# Patient Record
Sex: MALE | Race: WHITE | Employment: UNEMPLOYED | URBAN - METROPOLITAN AREA
[De-identification: names, ages, dates, MRNs, and addresses within clinical notes are randomized per-mention and may not be internally consistent; named-entity substitution may affect disease eponyms.]

---
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
## 2022-01-05 ENCOUNTER — OFFICE VISIT (OUTPATIENT)
Dept: ORTHOPEDIC SURGERY | Age: 22
End: 2022-01-05
Payer: OTHER GOVERNMENT

## 2022-01-05 VITALS — RESPIRATION RATE: 16 BRPM | WEIGHT: 120 LBS | BODY MASS INDEX: 16.8 KG/M2 | HEIGHT: 71 IN

## 2022-01-05 DIAGNOSIS — S62.024A NONDISPLACED FRACTURE OF MIDDLE THIRD OF NAVICULAR (SCAPHOID) BONE OF RIGHT WRIST, INITIAL ENCOUNTER FOR CLOSED FRACTURE: Primary | ICD-10-CM

## 2022-01-05 PROCEDURE — 99024 POSTOP FOLLOW-UP VISIT: CPT | Performed by: PHYSICIAN ASSISTANT

## 2022-01-05 PROCEDURE — 29075 APPL CST ELBW FNGR SHORT ARM: CPT | Performed by: PHYSICIAN ASSISTANT

## 2022-01-05 NOTE — PROGRESS NOTES
Shirley Goodman PA-C. ordered a spica cast  to be applied to Felicita Guerra's left upper Hand . I applied a spica cast cast to Felicita Guerra's left upper Hand. I applied 2 rolls of under padding in a 50/50 fashion. The Felicita Guerra requested orange color fiberglass. I rolled 2 rolls of fiberglass in a 50/50 fashion. His left upper Hand is in a neutral degree position Fracisco Guillaume PA-C . Felicita Guerra's nail beds are pink in color, the extremity is warm to the touch. Capillary refill is less than 2 seconds. Rhiannon Shin instructed on proper care of cast.  Do not get wet, keep all items out of cast.  If cast is painful please make appointment to get checked. Patient also briefed on circulation compromise. If digits are cold, blue, and tingling patient must must seek care. If after hours patient is to go to Emergency Room. During office hours patient must come in to office.

## 2022-01-05 NOTE — PROGRESS NOTES
Mr. Jairon Liriano returns today in follow-up of his recent left Scaphoid Nonunion with distal radius bone graft Repair done approximately 1 week ago. He has done well noting mild discomfort and no other reported complications. He notes mild symptoms of numbness, tingling, that has improved in the last no symptoms related to perfusion. Physical Exam:  Skin incision is healing well, without erythema, drainage or sign of infection. Digital range of motion is Full. FPL function is Intact, EPL function is Intact  Wrist range of motion is stiff from immobilization and not stressed today. Sensation is tingling} in the dorsal aspect of the wrist near his surgical incision. Vascular examination reveals normal, good capillary refill and good color. Swelling is minimal.  There is some tenderness to palpation of the scaphoid at the anatomic snuff-box or the volar tuberacle. Radiographic Evaluation:  Radiographs were obtained today (3 views of the left wrist & scaphoid view). They demonstrate good restoration of alignment of the scaphoid fragments without sign of hardware loosening or failure of fixation. Impression:  Mr. Jairon Liriano is doing well after recent left Scaphoid Nonunion with distal radius bone graft Repair. Plan:  Mr. Jairon Liriano is returned to a short arm thumb spica fiberglass cast and given instructions regarding the appropriate wear and care of the device. He is also instructed in work on Active & Passive range of motion of the digits & elbow. These modalities were specifically demonstrated to him today. We discussed the necessary restrictions on the use of the injured hand & wrist and the limitations on resumption of activities. We discussed the appropriate expectations and timeline for symptom improvement. He is provided a written patient instruction sheet titled: Postoperative Instructions After Scaphoid Fracture Repair.     I have asked Mr. Jairon Liriano to follow-up with me by scheduling an appointment for 1 month from now at which time we will obtain repeat radiographs of the wrist & Scaphoid out of the cast.     He is also specifically instructed to return to the office or call for an appointment sooner if his symptoms are changing or worsening prior to that time.

## 2022-01-24 ENCOUNTER — OFFICE VISIT (OUTPATIENT)
Dept: ORTHOPEDIC SURGERY | Age: 22
End: 2022-01-24
Payer: OTHER GOVERNMENT

## 2022-01-24 VITALS — BODY MASS INDEX: 16.8 KG/M2 | WEIGHT: 120 LBS | HEIGHT: 71 IN | RESPIRATION RATE: 15 BRPM

## 2022-01-24 DIAGNOSIS — S62.024A NONDISPLACED FRACTURE OF MIDDLE THIRD OF NAVICULAR (SCAPHOID) BONE OF RIGHT WRIST, INITIAL ENCOUNTER FOR CLOSED FRACTURE: Primary | ICD-10-CM

## 2022-01-24 PROCEDURE — 99024 POSTOP FOLLOW-UP VISIT: CPT | Performed by: PHYSICIAN ASSISTANT

## 2022-01-24 PROCEDURE — 29075 APPL CST ELBW FNGR SHORT ARM: CPT | Performed by: PHYSICIAN ASSISTANT

## 2022-01-24 NOTE — PROGRESS NOTES
Dana Batista PA-C. ordered a spica cast  to be applied to Felicita Guerra's left upper Hand . I applied a spica cast cast to Felicita Guerra's left upper Hand. I applied 2 rolls of under padding in a 50/50 fashion. The Felicita Guerra requested orange color fiberglass. I rolled 2 rolls of fiberglass in a 50/50 fashion. His right upper and left upper Hand is in a neutral  position Fracisco Guillaume PA-C . Felicita Guerra's nail beds are pink in color, the extremity is warm to the touch. Capillary refill is less than 2 seconds. Liane Andersen instructed on proper care of cast.  Do not get wet, keep all items out of cast.  If cast is painful please make appointment to get checked. Patient also briefed on circulation compromise. If digits are cold, blue, and tingling patient must must seek care. If after hours patient is to go to Emergency Room. During office hours patient must come in to office.         [address] [facility], OTR/L [id number]

## 2022-01-24 NOTE — PROGRESS NOTES
Mr. Debra Rider returns today in follow-up of his recent left Scaphoid Fracture Nonunion with percutaneous distal radius bone graft  Repair done approximately 4 weeks ago. He has done well noting mild discomfort and no other reported complications. He notes no symptoms of numbness, tingling, no symptoms related to perfusion. Physical Exam:  Skin incision is healing well, without erythema, drainage or sign of infection. Digital range of motion is Full. FPL function is Intact, EPL function is Intact  Wrist range of motion is stiff from immobilization and not stressed today. Sensation is normal} in the Whole Hand. Vascular examination reveals normal, good capillary refill and good color. Swelling is minimal.  There is little tenderness to palpation of the scaphoid at the anatomic snuff-box or the volar tuberacle. Radiographic Evaluation:  Radiographs were obtained today (3 views of the left wrist & scaphoid view). They demonstrate good restoration of alignment of the scaphoid fragments without sign of hardware loosening or failure of fixation. Impression:  Mr. Debra Rider is doing well after recent left Scaphoid Fracture Nonunion with percutaneous distal radius bone graft  Repair     Plan:  Mr. Debra Rider is returned to a short arm thumb spica fiberglass cast and given instructions regarding the appropriate wear and care of the device. He is also instructed in work on Active & Passive range of motion of the digits & elbow. These modalities were specifically demonstrated to him today. We discussed the necessary restrictions on the use of the injured hand & wrist and the limitations on resumption of activities. We discussed the appropriate expectations and timeline for symptom improvement. He is provided a written patient instruction sheet titled: Postoperative Instructions After Scaphoid Fracture Repair.     I have asked Mr. Debra Rider to follow-up with me by scheduling an appointment for 1 month from now at which time we will obtain repeat radiographs of the wrist & Scaphoid out of the cast.     He is also specifically instructed to return to the office or call for an appointment sooner if his symptoms are changing or worsening prior to that time.

## 2022-02-21 ENCOUNTER — OFFICE VISIT (OUTPATIENT)
Dept: ORTHOPEDIC SURGERY | Age: 22
End: 2022-02-21
Payer: OTHER GOVERNMENT

## 2022-02-21 VITALS — WEIGHT: 120 LBS | HEIGHT: 71 IN | BODY MASS INDEX: 16.8 KG/M2 | RESPIRATION RATE: 16 BRPM

## 2022-02-21 DIAGNOSIS — S62.024A NONDISPLACED FRACTURE OF MIDDLE THIRD OF NAVICULAR (SCAPHOID) BONE OF RIGHT WRIST, INITIAL ENCOUNTER FOR CLOSED FRACTURE: Primary | ICD-10-CM

## 2022-02-21 PROCEDURE — 99024 POSTOP FOLLOW-UP VISIT: CPT | Performed by: ORTHOPAEDIC SURGERY

## 2022-02-21 PROCEDURE — 29075 APPL CST ELBW FNGR SHORT ARM: CPT | Performed by: ORTHOPAEDIC SURGERY

## 2022-02-21 NOTE — PROGRESS NOTES
Mr. Chris Heredia returns today in follow-up of his recent right Scaphoid Fracture Repair done approximately 6 weeks ago. He has done well noting mild discomfort and no other reported complications. He notes no symptoms of numbness, tingling, no symptoms related to perfusion. Physical Exam:  Skin incision is fully healed , nontender. Digital range of motion is without significant limitation. FPL function is Intact, EPL function is Intact  Wrist range of motion is stiff from immobilization and not stressed today. Sensation is normal} in the Whole Hand. Vascular examination reveals normal and good capillary refill. Swelling is mild. There is little tenderness to palpation of the scaphoid at the anatomic snuff-box or the volar tuberacle. Radiographic Evaluation:  Radiographs were obtained today (3 views of the right wrist & scaphoid view). They demonstrate good restoration of alignment of the scaphoid fragments without sign of hardware loosening or failure of fixation. Impression:  Mr. Chris Heredia is doing well after recent right Scaphoid Fracture Repair. Plan:  Mr. Chris Heredia is returned to a short arm thumb spica fiberglass cast and given instructions regarding the appropriate wear and care of the device. He is also instructed in work on Active & Passive range of motion of the digits & elbow. These modalities were specifically demonstrated to him today. We discussed the necessary restrictions on the use of the injured hand & wrist and the limitations on resumption of activities. We discussed the appropriate expectations and timeline for symptom improvement. He is provided a written patient instruction sheet titled: Postoperative Instructions After Scaphoid Fracture Repair.     I have asked Mr. Chris Heredia to follow-up with me by scheduling an appointment for 1 month from now at which time we will obtain repeat radiographs of the wrist & Scaphoid out of the cast.     He is also specifically instructed to return to the office or call for an appointment sooner if his symptoms are changing or worsening prior to that time.

## 2022-02-21 NOTE — PROGRESS NOTES
King Rubio PA-C. ordered a spica cast  to be applied to Felicita Guerra's left  Hand . I applied a spica cast cast to Felicita Guerra's left upper Hand. I applied 2 rolls of under padding in a 50/50 fashion. The Felicita Guerra requested blue color fiberglass. I rolled 2 rolls of fiberglass in a 50/50 fashion. His left  wrist is in a nuetral  position Fracisco Guillaume PA-C . Felicita Guerra's nail beds are pink in color, the extremity is warm to the touch. Capillary refill is less than 2 seconds. Misa Mcfadden instructed on proper care of cast.  Do not get wet, keep all items out of cast.  If cast is painful please make appointment to get checked. Patient also briefed on circulation compromise. If digits are cold, blue, and tingling patient must must seek care. If after hours patient is to go to Emergency Room. During office hours patient must come in to office.            [address] [facility], OTR/L [id number]

## 2022-02-21 NOTE — Clinical Note
Dear  Luis Neri, APRN - CNP,    Thank you very much for your referral or Mr. Amina Menjivar to me for evaluation and treatment of his Hand & Wrist condition. I appreciate your confidence in me and thank you for allowing me the opportunity to care for your patients. If I can be of any further assistance to you on this or any other patient, please do not hesitate to contact me. Sincerely,    [place].  Zion Washburn MD

## 2022-02-22 NOTE — PATIENT INSTRUCTIONS
CAST CARE INSTRUCTIONS     Elevate your injured limb to reduce swelling. Elevate above your heart level. If upper extremity (i.e. hand/wrist) elevate fingers above eye level. Exercise fingers & toes frequently.  Do not stick anything inside your cast to scratch. Using a  or sharp object to scratch under a cast can be harmful and irritating to the skin. This can cause an infection with long-term problems.  Itching is usually caused by moisture and/or perspiration so keep your cast dry. If after a few days the itching persist you may want to sprinkle some baby powder into the end of the cast.  This should never be done if you have any incisions, sores or pins under the cast.      Never get your cast wet. o If you have any type of incision, sores or pins under the cast this can lead to an infection. o Cast protectors for showering are available in our office as well as most medical supply companies and some pharmacies. o Ask the technician for instructions for swimming.  IF YOUR CAST BECOMES WET CONTACT OUR OFFICE.  For a walking cast you must wear a cast shoe at all times when on your feet. Going without the shoe will cause the cast to crack on the bottom. Injury can also result from slipping without the protection of a cast shoe. If your cast begins to crack, contact our office. .   Casts are never completely comfortable and some pain and swelling are common. Below are some warning signs. You should contact your doctor if you experience:  o Extreme/worsening pain.  o Numbness or tingling. o New increasing tightness. o Swollen, blue or cold fingers or toes. o Rubbing from the cast that persists and causes pain. HUMUROUS RULES FOR KIDS     Do not hit anyone with your cast, especially brothers and sisters.  If your cast itches, scratch the opposite arm or leg.  Do not stick anything in your cast.  We will keep any lunch money that is found.   

## 2022-03-21 ENCOUNTER — OFFICE VISIT (OUTPATIENT)
Dept: ORTHOPEDIC SURGERY | Age: 22
End: 2022-03-21

## 2022-03-21 VITALS — RESPIRATION RATE: 16 BRPM | BODY MASS INDEX: 16.8 KG/M2 | WEIGHT: 120 LBS | HEIGHT: 71 IN

## 2022-03-21 DIAGNOSIS — S62.024A NONDISPLACED FRACTURE OF MIDDLE THIRD OF NAVICULAR (SCAPHOID) BONE OF RIGHT WRIST, INITIAL ENCOUNTER FOR CLOSED FRACTURE: Primary | ICD-10-CM

## 2022-03-21 PROCEDURE — 99024 POSTOP FOLLOW-UP VISIT: CPT | Performed by: PHYSICIAN ASSISTANT

## 2022-03-21 NOTE — PROGRESS NOTES
Mr. Jhonathan Garza returns today in follow-up of his recent left Scaphoid Fracture Repair done approximately 12 weeks ago. He has done well noting no discomfort and no other reported complications. He notes no symptoms of numbness, tingling, no symptoms related to perfusion. Physical Exam:  Skin incision is healing well, without erythema, drainage or sign of infection. Digital range of motion is Full. FPL function is Intact, EPL function is Intact  Wrist range of motion is stiff from immobilization and not stressed today. Sensation is normal} in the Whole Hand. Vascular examination reveals normal, good capillary refill and good color. Swelling is minimal.  There is little tenderness to palpation of the scaphoid at the anatomic snuff-box or the volar tuberacle. Radiographic Evaluation:  Radiographs were obtained today (3 views of the left wrist & scaphoid view). They demonstrate good restoration of alignment of the scaphoid fragments without sign of hardware loosening or failure of fixation. The fracture does appeared fully united at this time. Impression:  Mr. Jhonathan Garza is doing well after recent left Scaphoid Fracture Repair. He does appear to have united his scaphoid at this time. Plan:  Mr. Jhonathan Garza is transitioned to a forearm based thumb spica orthosis and given instructions regarding the appropriate wear and care of the device. He was provided one in December and was instructed to use this and put it on once he gets home. He is also instructed in work on Active & Passive range of motion of the digits & elbow. We discussed the discussed the fact that his fracture appears healed on plain radiographs at this time, but this imaging may in some instances be unreliable at determining fracture union. An order for a CT scan of the scaphoid (which is more reliably able to determing fracture union) was provided. He has agreed to obtain the test by my protocol.     I have asked Mr. De La O Askew to schedule the CT scan protocol and follow-up with me thereafter to review the findings and plan future treatment as indicated. He is also specifically instructed to return to the office or call for an appointment sooner if his symptoms are changing or worsening prior to that time.

## 2022-03-21 NOTE — PATIENT INSTRUCTIONS
Thank you for choosing [facility]) Physicians for your Hand and Upper Extremity needs. If we can be of any further assistance to you, please do not hesitate to contact us.     Office Phone Number:  [phone]  or  [phone]

## 2022-03-25 ENCOUNTER — HOSPITAL ENCOUNTER (OUTPATIENT)
Dept: CT IMAGING | Age: 22
Discharge: HOME | End: 2022-03-25
Payer: OTHER GOVERNMENT

## 2022-03-25 DIAGNOSIS — S62.024A NONDISPLACED FRACTURE OF MIDDLE THIRD OF NAVICULAR (SCAPHOID) BONE OF RIGHT WRIST, INITIAL ENCOUNTER FOR CLOSED FRACTURE: ICD-10-CM

## 2022-03-25 PROCEDURE — 73200 CT UPPER EXTREMITY W/O DYE: CPT

## 2022-03-30 ENCOUNTER — TELEPHONE (OUTPATIENT)
Dept: ORTHOPEDIC SURGERY | Age: 22
End: 2022-03-30

## 2022-03-30 NOTE — TELEPHONE ENCOUNTER
General Question     Subject: PATIENT WOULD LIKE TO KNOW IF HE COULD GET AND TELEPHONE CALL ABOUT HIS CT RESULTS OR DO HE HAVE TO MAKE AND FOLLOW UP APPT. PLEASE ADVISE.   Patient Marisol Shey Number: [phone]

## 2022-03-31 NOTE — TELEPHONE ENCOUNTER
Spoke with patient's mother. I told her that he needed to come in to have his CT results. She does not want to bring him in for the results. I told her that I would speak to Dr. Margarita Barr tomorrow to see if he would give the results over the phone.

## 2022-04-01 NOTE — TELEPHONE ENCOUNTER
Spoke with pt and informed per CT report, scaphoid fracture appears completely united. Informed pt he would have to make a f/u appt to discuss any other questions with Dr. Zak Mancera.

## 2023-09-12 ENCOUNTER — OFFICE VISIT (OUTPATIENT)
Dept: FAMILY MEDICINE CLINIC | Age: 23
End: 2023-09-12
Payer: OTHER GOVERNMENT

## 2023-09-12 VITALS
DIASTOLIC BLOOD PRESSURE: 67 MMHG | HEIGHT: 71 IN | BODY MASS INDEX: 19.11 KG/M2 | WEIGHT: 136.5 LBS | OXYGEN SATURATION: 98 % | SYSTOLIC BLOOD PRESSURE: 112 MMHG | TEMPERATURE: 98.5 F | HEART RATE: 52 BPM

## 2023-09-12 DIAGNOSIS — R55 NEAR SYNCOPE: Primary | ICD-10-CM

## 2023-09-12 DIAGNOSIS — E86.0 DEHYDRATION: ICD-10-CM

## 2023-09-12 LAB
BILIRUBIN, POC: NEGATIVE
BLOOD URINE, POC: NEGATIVE
CLARITY, POC: CLEAR
COLOR, POC: YELLOW
GLUCOSE URINE, POC: NEGATIVE
KETONES, POC: NEGATIVE
LEUKOCYTE EST, POC: NEGATIVE
NITRITE, POC: NEGATIVE
PH, POC: 6
PROTEIN, POC: 30
SPECIFIC GRAVITY, POC: 1.03
UROBILINOGEN, POC: 0.2

## 2023-09-12 PROCEDURE — 99213 OFFICE O/P EST LOW 20 MIN: CPT | Performed by: NURSE PRACTITIONER

## 2023-09-12 PROCEDURE — 81002 URINALYSIS NONAUTO W/O SCOPE: CPT | Performed by: NURSE PRACTITIONER

## 2023-09-12 SDOH — ECONOMIC STABILITY: FOOD INSECURITY: WITHIN THE PAST 12 MONTHS, THE FOOD YOU BOUGHT JUST DIDN'T LAST AND YOU DIDN'T HAVE MONEY TO GET MORE.: NEVER TRUE

## 2023-09-12 SDOH — ECONOMIC STABILITY: HOUSING INSECURITY
IN THE LAST 12 MONTHS, WAS THERE A TIME WHEN YOU DID NOT HAVE A STEADY PLACE TO SLEEP OR SLEPT IN A SHELTER (INCLUDING NOW)?: NO

## 2023-09-12 SDOH — ECONOMIC STABILITY: INCOME INSECURITY: HOW HARD IS IT FOR YOU TO PAY FOR THE VERY BASICS LIKE FOOD, HOUSING, MEDICAL CARE, AND HEATING?: NOT HARD AT ALL

## 2023-09-12 SDOH — ECONOMIC STABILITY: FOOD INSECURITY: WITHIN THE PAST 12 MONTHS, YOU WORRIED THAT YOUR FOOD WOULD RUN OUT BEFORE YOU GOT MONEY TO BUY MORE.: NEVER TRUE

## 2023-09-12 ASSESSMENT — PATIENT HEALTH QUESTIONNAIRE - PHQ9
SUM OF ALL RESPONSES TO PHQ QUESTIONS 1-9: 1
SUM OF ALL RESPONSES TO PHQ QUESTIONS 1-9: 1
1. LITTLE INTEREST OR PLEASURE IN DOING THINGS: 1
SUM OF ALL RESPONSES TO PHQ9 QUESTIONS 1 & 2: 1
2. FEELING DOWN, DEPRESSED OR HOPELESS: 0
SUM OF ALL RESPONSES TO PHQ QUESTIONS 1-9: 1
SUM OF ALL RESPONSES TO PHQ QUESTIONS 1-9: 1

## 2023-09-12 ASSESSMENT — ENCOUNTER SYMPTOMS
RESPIRATORY NEGATIVE: 1
NAUSEA: 1
VOMITING: 1

## 2023-09-12 NOTE — PROGRESS NOTES
CHIEF COMPLAINT  Chief Complaint   Patient presents with    Other     Possible heat exhaustion since yesterday         HPI   Slime Rios is a 23 y.o. male who presents to the office complaining of episode of lightheadedness onset yesterday. Patient currently asymptomatic. Patient reports that symptoms started yesterday while he was helping move a couch out of a basement. Patient reports that he got very lightheaded and felt like he was going to pass out. Patient reports that he did get nauseous and vomit x1 episode. Patient reports that he had not ate or drank anything since the night before. Patient reports that symptoms similar to this have occurred in the past when he has not ate or drink anything. Patient denies any chest pain or shortness of breath. No numbness or tingling in extremities. No other complaints, modifying factors or associated symptoms. Nursing notes reviewed. Past Medical History:   Diagnosis Date    ADD (attention deficit disorder) without hyperactivity     [facility]    Adjustment disorder with anxiety     [facility]    Dyslexia 10/13    Learning disabilities     Scoliosis      Past Surgical History:   Procedure Laterality Date    WRIST FRACTURE SURGERY Left 12/30/2021    PERCUTANEOUS SCREW FIXATION OF LEFT SCAPHOID FRACTURE / NONUNION WITH PERCUTANEOUS DISTAL RADIAL BONE GRAFT performed by Abdelrahman Lester MD at [facility] History   Problem Relation Age of Onset    Asthma Mother     High Blood Pressure Father     High Cholesterol Father     Arthritis Father     Asthma Sister      Social History     Socioeconomic History    Marital status: Single     Spouse name: Not on file    Number of children: Not on file    Years of education: Not on file    Highest education level: Not on file   Occupational History    Not on file   Tobacco Use    Smoking status: Never    Smokeless tobacco: Never   Vaping Use    Vaping Use: Never used   Substance and Sexual Activity    Alcohol use:  No

## (undated) DEVICE — Z DISCONTINUED USE 2272117 DRAPE SURG 3 QTR N INVASIVE 2 LAYR DISP

## (undated) DEVICE — SOLUTION IV IRRIG 250ML ST LF 0.9% SODIUM 2F7122

## (undated) DEVICE — DRIVER SURG DIA3.5MM FOR HDLSS COMPR SCR SYS REDUCT

## (undated) DEVICE — TUBING, SUCTION, 1/4" X 12', STRAIGHT: Brand: MEDLINE

## (undated) DEVICE — BANDAGE COMPR W4INXL15FT BGE E SGL LAYERED CLP CLSR

## (undated) DEVICE — DECANTER BAG 9": Brand: MEDLINE INDUSTRIES, INC.

## (undated) DEVICE — GOWN SIRUS NONREIN XL W/TWL: Brand: MEDLINE INDUSTRIES, INC.

## (undated) DEVICE — STRIP,CLOSURE,WOUND,MEDI-STRIP,1/2X4: Brand: MEDLINE

## (undated) DEVICE — GLOVE SURG SZ 65 CRM LTX FREE POLYISOPRENE POLYMER BEAD ANTI

## (undated) DEVICE — PADDING UNDERCAST W4INXL4YD 100% COT CRIMPED FINISH WBRL II

## (undated) DEVICE — GLOVE SURG SZ 75 CRM LTX FREE POLYISOPRENE POLYMER BEAD ANTI

## (undated) DEVICE — BIT DRL DIA2.7MM CANN QUIK CONN FOR HDLSS COMPR SCR SYS

## (undated) DEVICE — NEEDLE BNE MAR ASPIR 11 GA FEN X BLNT STYL STRL RAN11B] RANFAC CORP]

## (undated) DEVICE — WILLIS PACK: Brand: MEDLINE INDUSTRIES, INC.

## (undated) DEVICE — C-ARM PACK: Brand: C-ARM COVER

## (undated) DEVICE — BANDAGE COMPR W2INXL5YD TAN BRTH SELF ADH WRP W/ HND TEAR

## (undated) DEVICE — GLOVE SURG SZ 65 L12IN FNGR THK79MIL GRN LTX FREE